# Patient Record
Sex: FEMALE | Race: BLACK OR AFRICAN AMERICAN | HISPANIC OR LATINO
[De-identification: names, ages, dates, MRNs, and addresses within clinical notes are randomized per-mention and may not be internally consistent; named-entity substitution may affect disease eponyms.]

---

## 2019-10-21 ENCOUNTER — TRANSCRIPTION ENCOUNTER (OUTPATIENT)
Age: 44
End: 2019-10-21

## 2021-03-09 ENCOUNTER — TRANSCRIPTION ENCOUNTER (OUTPATIENT)
Age: 46
End: 2021-03-09

## 2021-03-10 ENCOUNTER — APPOINTMENT (OUTPATIENT)
Dept: DISASTER EMERGENCY | Facility: HOSPITAL | Age: 46
End: 2021-03-10

## 2021-03-10 PROBLEM — Z00.00 ENCOUNTER FOR PREVENTIVE HEALTH EXAMINATION: Status: ACTIVE | Noted: 2021-03-10

## 2021-03-11 ENCOUNTER — TRANSCRIPTION ENCOUNTER (OUTPATIENT)
Age: 46
End: 2021-03-11

## 2021-04-13 ENCOUNTER — TRANSCRIPTION ENCOUNTER (OUTPATIENT)
Age: 46
End: 2021-04-13

## 2023-07-13 ENCOUNTER — NON-APPOINTMENT (OUTPATIENT)
Age: 48
End: 2023-07-13

## 2023-07-26 ENCOUNTER — NON-APPOINTMENT (OUTPATIENT)
Age: 48
End: 2023-07-26

## 2024-01-12 ENCOUNTER — APPOINTMENT (OUTPATIENT)
Dept: ORTHOPEDIC SURGERY | Facility: CLINIC | Age: 49
End: 2024-01-12
Payer: COMMERCIAL

## 2024-01-12 DIAGNOSIS — Z78.9 OTHER SPECIFIED HEALTH STATUS: ICD-10-CM

## 2024-01-12 DIAGNOSIS — Z86.79 PERSONAL HISTORY OF OTHER DISEASES OF THE CIRCULATORY SYSTEM: ICD-10-CM

## 2024-01-12 DIAGNOSIS — S93.401A SPRAIN OF UNSPECIFIED LIGAMENT OF RIGHT ANKLE, INITIAL ENCOUNTER: ICD-10-CM

## 2024-01-12 DIAGNOSIS — Z86.39 PERSONAL HISTORY OF OTHER ENDOCRINE, NUTRITIONAL AND METABOLIC DISEASE: ICD-10-CM

## 2024-01-12 PROCEDURE — 99203 OFFICE O/P NEW LOW 30 MIN: CPT

## 2024-01-12 PROCEDURE — 73610 X-RAY EXAM OF ANKLE: CPT | Mod: RT

## 2024-01-12 RX ORDER — LISINOPRIL 30 MG/1
TABLET ORAL
Refills: 0 | Status: ACTIVE | COMMUNITY

## 2024-01-12 RX ORDER — SITAGLIPTIN 100 MG/1
TABLET, FILM COATED ORAL
Refills: 0 | Status: ACTIVE | COMMUNITY

## 2024-01-12 RX ORDER — DAPAGLIFLOZIN 10 MG/1
TABLET, FILM COATED ORAL
Refills: 0 | Status: ACTIVE | COMMUNITY

## 2024-02-20 NOTE — HISTORY OF PRESENT ILLNESS
[de-identified] : 48-year-old  comes in for evaluation of her right ankle that she injured on 12/28/2023 when she was crossing the street in sneakers that her leg cleats and she slipped and fell down twisting her right ankle.  She has a history of an ankle sprain when she was much younger. Her pain is about 6 out of 10.  She has used Tiger balm and Tylenol and rested which was helpful.  It hurts walking and moving.  She had gone to urgent care and they gave her an Ace wrap.  No imaging was done.  No other treatment.

## 2024-02-20 NOTE — PHYSICAL EXAM
[Slightly Antalgic] : slightly antalgic [LE] : Sensory: Intact in bilateral lower extremities [Normal RLE] : Right Lower Extremity: No scars, rashes, lesions, ulcers, skin intact [Normal LLE] : Left Lower Extremity: No scars, rashes, lesions, ulcers, skin intact [Normal Touch] : sensation intact for touch [Obese] : obese [Normal] : Oriented to person, place, and time, insight and judgement were intact and the affect was normal [de-identified] : Right foot and ankle  Mildly antalgic gait.  She can raise on her heels and toes but with the pain. Mild edema right lateral ankle without ecchymoses or erythema and skin is intact. Tender over the anterior syndesmosis and ATFL and CFL.  Minimally tender fibula.  Nontender deltoid and medial ankle.  Nontender through the foot.  Nontender lower leg. Ankle range of motion is with 5 degrees dorsiflexion and 35 degrees plantarflexion. Mildly increased varus tilt. Intact anterior tibial tendon, gastrocsoleus, peroneals, posterior tibial tendon, EHL. Sensation is intact and foot is warm. Normal neurovascular exam [de-identified] :  x-rays of the right ankle WB 3 views ordered to assess ankle pain ordered performed and reviewed with patient showed intact mortise and no fractures.

## 2024-02-20 NOTE — ASSESSMENT
[FreeTextEntry1] : 48-year-old with right lateral ankle sprain that is also high sprain.  No fractures are seen.  The injury occurred a few weeks ago and is starting to improve partially.  I applied a Velcro lace up ankle brace today which did provide good support and pain relief.  She should wear this in a sneaker and use a cane if needed.  Alternate warm soaks and ice and elevate for swelling.  Gave her referral for physical therapy as well. Tylenol as needed for pain. Follow-up in 4 weeks to make sure she is improving.

## 2024-02-22 ENCOUNTER — APPOINTMENT (OUTPATIENT)
Dept: ORTHOPEDIC SURGERY | Facility: CLINIC | Age: 49
End: 2024-02-22

## 2024-02-26 NOTE — PHYSICAL EXAM
[Slightly Antalgic] : slightly antalgic [LE] : Sensory: Intact in bilateral lower extremities [Normal RLE] : Right Lower Extremity: No scars, rashes, lesions, ulcers, skin intact [Normal LLE] : Left Lower Extremity: No scars, rashes, lesions, ulcers, skin intact [Normal Touch] : sensation intact for touch [Obese] : obese [Normal] : Oriented to person, place, and time, insight and judgement were intact and the affect was normal [de-identified] : Right foot and ankle  Mildly antalgic gait.  She can raise on her heels and toes but with the pain. Mild edema right lateral ankle without ecchymoses or erythema and skin is intact. Tender over the anterior syndesmosis and ATFL and CFL.  Minimally tender fibula.  Nontender deltoid and medial ankle.  Nontender through the foot.  Nontender lower leg. Ankle range of motion is with 5 degrees dorsiflexion and 35 degrees plantarflexion. Mildly increased varus tilt. Intact anterior tibial tendon, gastrocsoleus, peroneals, posterior tibial tendon, EHL. Sensation is intact and foot is warm. Normal neurovascular exam [de-identified] :  x-rays 1/12/2024 of the right ankle WB 3 views showed intact mortise and no fractures.

## 2024-02-26 NOTE — HISTORY OF PRESENT ILLNESS
[de-identified] : 48-year-old with right lateral/high ankle sprain.  She wore the ankle brace after I saw her and then went for physical therapy.

## 2024-04-03 ENCOUNTER — NON-APPOINTMENT (OUTPATIENT)
Age: 49
End: 2024-04-03

## 2024-07-28 ENCOUNTER — NON-APPOINTMENT (OUTPATIENT)
Age: 49
End: 2024-07-28

## 2024-11-20 ENCOUNTER — NON-APPOINTMENT (OUTPATIENT)
Age: 49
End: 2024-11-20

## 2025-06-10 ENCOUNTER — NON-APPOINTMENT (OUTPATIENT)
Age: 50
End: 2025-06-10